# Patient Record
Sex: MALE | Race: OTHER | HISPANIC OR LATINO
[De-identification: names, ages, dates, MRNs, and addresses within clinical notes are randomized per-mention and may not be internally consistent; named-entity substitution may affect disease eponyms.]

---

## 2020-08-24 ENCOUNTER — TRANSCRIPTION ENCOUNTER (OUTPATIENT)
Age: 25
End: 2020-08-24

## 2022-12-12 ENCOUNTER — APPOINTMENT (OUTPATIENT)
Dept: OTOLARYNGOLOGY | Facility: CLINIC | Age: 27
End: 2022-12-12

## 2022-12-12 ENCOUNTER — OUTPATIENT (OUTPATIENT)
Dept: OUTPATIENT SERVICES | Facility: HOSPITAL | Age: 27
LOS: 1 days | Discharge: HOME | End: 2022-12-12

## 2022-12-12 DIAGNOSIS — F80.9 DEVELOPMENTAL DISORDER OF SPEECH AND LANGUAGE, UNSPECIFIED: ICD-10-CM

## 2022-12-12 DIAGNOSIS — Z86.59 PERSONAL HISTORY OF OTHER MENTAL AND BEHAVIORAL DISORDERS: ICD-10-CM

## 2022-12-12 DIAGNOSIS — H66.90 OTITIS MEDIA, UNSPECIFIED, UNSPECIFIED EAR: ICD-10-CM

## 2022-12-12 DIAGNOSIS — H91.93 UNSPECIFIED HEARING LOSS, BILATERAL: ICD-10-CM

## 2022-12-12 DIAGNOSIS — H92.11 OTORRHEA, RIGHT EAR: ICD-10-CM

## 2022-12-12 PROBLEM — Z00.00 ENCOUNTER FOR PREVENTIVE HEALTH EXAMINATION: Status: ACTIVE | Noted: 2022-12-12

## 2022-12-12 PROCEDURE — 69210 REMOVE IMPACTED EAR WAX UNI: CPT

## 2022-12-12 RX ORDER — OFLOXACIN OTIC 3 MG/ML
0.3 SOLUTION AURICULAR (OTIC)
Qty: 5 | Refills: 0 | Status: ACTIVE | COMMUNITY
Start: 2022-10-12

## 2022-12-12 RX ORDER — AMOXICILLIN AND CLAVULANATE POTASSIUM 875; 125 MG/1; MG/1
875-125 TABLET, COATED ORAL
Qty: 14 | Refills: 1 | Status: ACTIVE | COMMUNITY
Start: 2022-12-12 | End: 1900-01-01

## 2022-12-12 RX ORDER — ADHESIVE TAPE 3"X 2.3 YD
50 MCG TAPE, NON-MEDICATED TOPICAL
Qty: 30 | Refills: 0 | Status: ACTIVE | COMMUNITY
Start: 2022-10-26

## 2022-12-12 RX ORDER — RISPERIDONE 2 MG/1
2 TABLET, FILM COATED ORAL
Qty: 30 | Refills: 0 | Status: ACTIVE | COMMUNITY
Start: 2022-08-10

## 2022-12-12 RX ORDER — NEOMYCIN AND POLYMYXIN B SULFATES AND HYDROCORTISONE OTIC 10; 3.5; 1 MG/ML; MG/ML; [USP'U]/ML
3.5-10000-1 SUSPENSION AURICULAR (OTIC)
Qty: 1 | Refills: 1 | Status: ACTIVE | COMMUNITY
Start: 2022-12-12 | End: 1900-01-01

## 2022-12-12 NOTE — HISTORY OF PRESENT ILLNESS
[de-identified] : 27 year old man, initial visit for Right otorrhea\par States symptoms present for the past month - clear otorrhea\par Prescribed Ofloxacin drops that worked temporarily - patient continues to complain of pain\par Reports concern for hearing loss - speech delay? - speaks very little\par Mother states also recovering from influenza virus - no longer febrile\par \par

## 2022-12-12 NOTE — PHYSICAL EXAM
[FreeTextEntry1] : INTELLECTUAL DISABILITY [de-identified] : LINDEN ECZEMA/ RIGHT EAR PRULENT DEBRIS/ DISCHARGE REMOVED [Normal] : mucosa is normal [Midline] : trachea located in midline position

## 2022-12-12 NOTE — REASON FOR VISIT
[Initial Evaluation] : an initial evaluation for [Parent] : parent [Other: ______] : provided by [facility] [Source: ______] : History obtained from [unfilled] [FreeTextEntry2] : otorrhea [Interpreters_IDNumber] : [id number] [Interpreters_FullName] : Domenico [TWNoteComboBox1] : Camb[place]ian

## 2022-12-15 DIAGNOSIS — H66.90 OTITIS MEDIA, UNSPECIFIED, UNSPECIFIED EAR: ICD-10-CM

## 2022-12-15 DIAGNOSIS — H92.11 OTORRHEA, RIGHT EAR: ICD-10-CM

## 2023-01-09 ENCOUNTER — APPOINTMENT (OUTPATIENT)
Dept: OTOLARYNGOLOGY | Facility: CLINIC | Age: 28
End: 2023-01-09

## 2024-01-29 ENCOUNTER — OUTPATIENT (OUTPATIENT)
Dept: OUTPATIENT SERVICES | Facility: HOSPITAL | Age: 29
LOS: 1 days | End: 2024-01-29

## 2024-01-29 VITALS
RESPIRATION RATE: 16 BRPM | DIASTOLIC BLOOD PRESSURE: 77 MMHG | WEIGHT: 143.08 LBS | HEART RATE: 76 BPM | TEMPERATURE: 98 F | SYSTOLIC BLOOD PRESSURE: 124 MMHG | HEIGHT: 65 IN | OXYGEN SATURATION: 98 %

## 2024-01-29 DIAGNOSIS — K05.6 PERIODONTAL DISEASE, UNSPECIFIED: ICD-10-CM

## 2024-01-29 DIAGNOSIS — K02.62 DENTAL CARIES ON SMOOTH SURFACE PENETRATING INTO DENTIN: ICD-10-CM

## 2024-01-29 DIAGNOSIS — F79 UNSPECIFIED INTELLECTUAL DISABILITIES: ICD-10-CM

## 2024-01-29 DIAGNOSIS — Z98.890 OTHER SPECIFIED POSTPROCEDURAL STATES: Chronic | ICD-10-CM

## 2024-01-29 NOTE — H&P PST ADULT - FUNCTIONAL STATUS
adl's adl's with minimal assistance, able to walk 2 city blocks without SOB adl's with minimal assistance, able to walk 2 city blocks without SOB ~ met = 4

## 2024-01-29 NOTE — H&P PST ADULT - HISTORY OF PRESENT ILLNESS
29y/o intellectually disabled male accompanied by pt mother for preop eval for scheduled comprehensive dental treatment under general anesthesia.  As per pt mother having routine eval.

## 2024-01-29 NOTE — H&P PST ADULT - PROBLEM SELECTOR PLAN 1
Scheduled for comprehensive dental treatment under general anesthesia  Written & verbal preop instructions, gi prophylaxis & surgical soap given  Pt verbalized good understanding.  Teach back done on surgical soap instructions. Scheduled for comprehensive dental treatment under general anesthesia  Written & verbal preop instructions, gi prophylaxis  Pt verbalized good understanding. Scheduled for comprehensive dental treatment under general anesthesia  Written & verbal preop instructions, gi prophylaxis  Pt verbalized good understanding.  copy of medical eval, Labs & EKG in chart

## 2024-01-29 NOTE — H&P PST ADULT - NSICDXPASTMEDICALHX_GEN_ALL_CORE_FT
PAST MEDICAL HISTORY:  History of strabismus surgery     Impulse control disorder     Intellectual disability

## 2024-02-01 ENCOUNTER — TRANSCRIPTION ENCOUNTER (OUTPATIENT)
Age: 29
End: 2024-02-01

## 2024-02-01 NOTE — ASU PATIENT PROFILE, ADULT - CAREGIVER
Yes Quality 111:Pneumonia Vaccination Status For Older Adults: Pneumococcal Vaccination not Administered or Previously Received, Reason not Otherwise Specified Quality 130: Documentation Of Current Medications In The Medical Record: Eligible clinician attests to documenting in the medical record the patient is not eligible for a current list of medications being obtained, updated, or reviewed by the eligible clinician Detail Level: Detailed

## 2024-02-02 ENCOUNTER — OUTPATIENT (OUTPATIENT)
Dept: OUTPATIENT SERVICES | Facility: HOSPITAL | Age: 29
LOS: 1 days | Discharge: HOME | End: 2024-02-02

## 2024-02-02 ENCOUNTER — TRANSCRIPTION ENCOUNTER (OUTPATIENT)
Age: 29
End: 2024-02-02

## 2024-02-02 VITALS
WEIGHT: 143.08 LBS | DIASTOLIC BLOOD PRESSURE: 91 MMHG | HEART RATE: 68 BPM | HEIGHT: 65 IN | SYSTOLIC BLOOD PRESSURE: 123 MMHG | OXYGEN SATURATION: 100 % | TEMPERATURE: 98 F | RESPIRATION RATE: 16 BRPM

## 2024-02-02 VITALS — OXYGEN SATURATION: 92 % | RESPIRATION RATE: 18 BRPM | HEART RATE: 85 BPM

## 2024-02-02 DIAGNOSIS — Z98.890 OTHER SPECIFIED POSTPROCEDURAL STATES: Chronic | ICD-10-CM

## 2024-02-02 DIAGNOSIS — K02.62 DENTAL CARIES ON SMOOTH SURFACE PENETRATING INTO DENTIN: ICD-10-CM

## 2024-02-02 DEVICE — SURGICEL 2 X 14": Type: IMPLANTABLE DEVICE | Status: FUNCTIONAL

## 2024-02-02 RX ORDER — RISPERIDONE 4 MG/1
1 TABLET ORAL
Refills: 0 | DISCHARGE

## 2024-02-02 RX ORDER — FENTANYL CITRATE 50 UG/ML
25 INJECTION INTRAVENOUS
Refills: 0 | Status: DISCONTINUED | OUTPATIENT
Start: 2024-02-02 | End: 2024-02-02

## 2024-02-02 RX ORDER — ONDANSETRON 8 MG/1
4 TABLET, FILM COATED ORAL ONCE
Refills: 0 | Status: DISCONTINUED | OUTPATIENT
Start: 2024-02-02 | End: 2024-02-16

## 2024-02-02 RX ORDER — SODIUM CHLORIDE 9 MG/ML
1000 INJECTION, SOLUTION INTRAVENOUS
Refills: 0 | Status: DISCONTINUED | OUTPATIENT
Start: 2024-02-02 | End: 2024-02-16

## 2024-02-02 NOTE — ASU DISCHARGE PLAN (ADULT/PEDIATRIC) - ASU DC SPECIAL INSTRUCTIONSFT
comprehensive dental treatment under general anesthesia    exam, xrays, cleaning, restorative, periodontal treatment and extractions of three wisdom teeth of the upper right, lower right and upper left    no straw for two days and keep head elevated for the next two days and nights     see Dr Jiménez on 2/12 at 10:15 am at [facility] [place], appt is set     return to program on Monday    if there is any bleeding, bite on gauze for twenty minutes     if patient lies flat back, he will get swollen, please keep head elevated and ice the face for the next 24 hours on and off

## 2024-02-02 NOTE — ASU PATIENT PROFILE, ADULT - FALL HARM RISK - UNIVERSAL INTERVENTIONS
Bed in lowest position, wheels locked, appropriate side rails in place/Call bell, personal items and telephone in reach/Instruct patient to call for assistance before getting out of bed or chair/Non-slip footwear when patient is out of bed/[place] to call system/Physically safe environment - no spills, clutter or unnecessary equipment/Purposeful Proactive Rounding/Room/bathroom lighting operational, light cord in reach

## 2024-03-12 NOTE — ASU PATIENT PROFILE, ADULT - PATIENT/CAREGIVER OFFERED  INTERPRETER SERVICES
Case Management Discharge Planning Note    Patient name Ángela Camara  Location [place] [address] /South 2 M* MRN [id number]  : 1947 Date 3/12/2024         OBJECTIVE:  Risk of Unplanned Readmission Score: 11.97         Current admission status: Inpatient     DISCHARGE DETAILS:         IMM Given (Date):: 24  IMM Given to:: Patient                             yes

## (undated) DEVICE — TUBING SUCTION NONCONDUCTIVE 6MM X 12FT

## (undated) DEVICE — BASIN SET DOUBLE

## (undated) DEVICE — DRAPE INSTRUMENT POUCH 6.75" X 11"

## (undated) DEVICE — ELCTR GROUNDING PAD ADULT COVIDIEN

## (undated) DEVICE — POSITIONER FOAM HEAD CRADLE (PINK)

## (undated) DEVICE — SUCTION YANKAUER NO CONTROL VENT

## (undated) DEVICE — DRSG CURITY GAUZE SPONGE 4 X 4" 12-PLY

## (undated) DEVICE — VAGINAL PACKING 2"

## (undated) DEVICE — DRAPE MAGNETIC INSTRUMENT MEDIUM

## (undated) DEVICE — VENODYNE/SCD SLEEVE CALF MEDIUM

## (undated) DEVICE — GOWN XL

## (undated) DEVICE — DRAPE SPLIT SHEET 77" X 120"

## (undated) DEVICE — SOL IRR POUR NS 0.9% 500ML

## (undated) DEVICE — LABELS BLANK W PEN

## (undated) DEVICE — SPONGE END-STRUNG CYLINDRICAL .5X1.5"

## (undated) DEVICE — DRAPE LIGHT HANDLE COVER (GREEN)

## (undated) DEVICE — DRAPE CAMERA ENDOMATE

## (undated) DEVICE — DRAPE ISOLATION BAG 20X20"

## (undated) DEVICE — SYR ASEPTO

## (undated) DEVICE — DRAPE 3/4 SHEET 52X76"

## (undated) DEVICE — DRAPE SURGICAL #1010

## (undated) DEVICE — DRAPE TOWEL 1010